# Patient Record
Sex: MALE | Race: WHITE | NOT HISPANIC OR LATINO | Employment: FULL TIME | URBAN - METROPOLITAN AREA
[De-identification: names, ages, dates, MRNs, and addresses within clinical notes are randomized per-mention and may not be internally consistent; named-entity substitution may affect disease eponyms.]

---

## 2022-06-03 ENCOUNTER — OFFICE VISIT (OUTPATIENT)
Dept: PRIMARY CARE CLINIC | Facility: CLINIC | Age: 32
End: 2022-06-03
Payer: COMMERCIAL

## 2022-06-03 DIAGNOSIS — F39 MOOD DISORDER: Primary | ICD-10-CM

## 2022-06-03 PROCEDURE — 99344 HOME/RES VST NEW MOD MDM 60: CPT | Mod: 95,,, | Performed by: FAMILY MEDICINE

## 2022-06-03 PROCEDURE — 1159F PR MEDICATION LIST DOCUMENTED IN MEDICAL RECORD: ICD-10-PCS | Mod: CPTII,95,, | Performed by: FAMILY MEDICINE

## 2022-06-03 PROCEDURE — 1160F PR REVIEW ALL MEDS BY PRESCRIBER/CLIN PHARMACIST DOCUMENTED: ICD-10-PCS | Mod: CPTII,95,, | Performed by: FAMILY MEDICINE

## 2022-06-03 PROCEDURE — 1160F RVW MEDS BY RX/DR IN RCRD: CPT | Mod: CPTII,95,, | Performed by: FAMILY MEDICINE

## 2022-06-03 PROCEDURE — 99344 PR HOME VISIT,NEW PATIENT,LEVEL IV: ICD-10-PCS | Mod: 95,,, | Performed by: FAMILY MEDICINE

## 2022-06-03 PROCEDURE — 1159F MED LIST DOCD IN RCRD: CPT | Mod: CPTII,95,, | Performed by: FAMILY MEDICINE

## 2022-06-03 RX ORDER — HYDROCODONE BITARTRATE AND ACETAMINOPHEN 7.5; 325 MG/1; MG/1
1 TABLET ORAL
COMMUNITY
Start: 2022-01-07

## 2022-06-03 RX ORDER — DESVENLAFAXINE SUCCINATE 25 MG/1
25 TABLET, EXTENDED RELEASE ORAL
COMMUNITY
Start: 2021-07-02 | End: 2022-07-18

## 2022-06-03 NOTE — ASSESSMENT & PLAN NOTE
Given patient information for crisis hotline and ER precautions for any suicidal or homicidal thoughts, recommend counseling focusing on CBT or mindfulness therapy and start Seroquel 50 mg nightly for 3 days and then increase to 100 mg nightly in contact clinic after 1 week for further adjustment if needed

## 2022-06-03 NOTE — PROGRESS NOTES
Established Patient - Audio Only Telehealth Visit     The patient location is:  Home   The chief complaint leading to consultation is: mood disorder  Visit type: Virtual visit with audio only (telephone)  Total time spent with patient: 21       The reason for the audio only service rather than synchronous audio and video virtual visit was related to technical difficulties or patient preference/necessity.     Each patient to whom I provide medical services by telemedicine is:  (1) informed of the relationship between the physician and patient and the respective role of any other health care provider with respect to management of the patient; and (2) notified that they may decline to receive medical services by telemedicine and may withdraw from such care at any time. Patient verbally consented to receive this service via voice-only telephone call.       HPI:  Patient contacts clinic with complaint of mood her.  Patient has a known prior history of depression and anxiety with reported intermittent bursts of anger.  He has been treated with is since his time in  service with different SSRIs/SNRIs including Prozac and Zoloft stating that Zoloft caused suicidal thoughts but not continued since stopping the medicine and most recently Pristiq which is 25 mg did offer several months of improvement of symptoms however he stopped taking it over a month ago when he found that his symptoms began to not be improved again especially when he came to mood regulation with anger issues.  With that he and his wife had noticed that he experiences hives and believes it may be a history of undiagnosed bipolar disorder in his father.     Assessment and plan:    Disorder-  Discussed possible options for treatment in the future including referral to Psychology or Psychiatry inadequate management is noted in this clinic.  At this time we will initiate Seroquel at 50 mg nightly and after 3 days increase to 100 mg nightly in contact  clinic in 1 week with results.  If improvement is noted without unwanted side effects but persistent mood related issues occur may increase dose further or reinitiate low-dose Pristiq 25 mg daily    RTC 1 month                        This service was not originating from a related E/M service provided within the previous 7 days nor will  to an E/M service or procedure within the next 24 hours or my soonest available appointment.  Prevailing standard of care was able to be met in this audio-only visit.

## 2022-06-06 ENCOUNTER — TELEPHONE (OUTPATIENT)
Dept: PRIMARY CARE CLINIC | Facility: CLINIC | Age: 32
End: 2022-06-06
Payer: COMMERCIAL

## 2022-06-06 RX ORDER — QUETIAPINE FUMARATE 50 MG/1
50 TABLET, FILM COATED ORAL NIGHTLY
Qty: 30 TABLET | Refills: 11 | Status: SHIPPED | OUTPATIENT
Start: 2022-06-06 | End: 2022-07-07 | Stop reason: SDUPTHER

## 2022-06-06 NOTE — TELEPHONE ENCOUNTER
----- Message from Rory Patino sent at 6/6/2022 11:10 AM CDT -----  .Type:  RX Refill Request    Who Called: Dyllan  Refill or New Rx: refill  RX Name and Strength: Seroquel   How is the patient currently taking it? (ex. 1XDay):  Is this a 30 day or 90 day RX:  Preferred Pharmacy with phone number: [facility] in [place], [place] # [phone]  Local or Mail Order :  Ordering Provider: Steve  Would the patient rather a call back or a response via [facility]?   Best Call Back Number: [phone]  Additional Information: He had called Friday to refill please call him if you have any questions.

## 2022-07-07 DIAGNOSIS — F39 MOOD DISORDER: Primary | ICD-10-CM

## 2022-07-08 RX ORDER — QUETIAPINE FUMARATE 50 MG/1
50 TABLET, FILM COATED ORAL NIGHTLY
Qty: 30 TABLET | Refills: 11 | Status: SHIPPED | OUTPATIENT
Start: 2022-07-08 | End: 2022-07-18

## 2022-07-18 ENCOUNTER — OFFICE VISIT (OUTPATIENT)
Dept: PRIMARY CARE CLINIC | Facility: CLINIC | Age: 32
End: 2022-07-18
Payer: COMMERCIAL

## 2022-07-18 DIAGNOSIS — F39 MOOD DISORDER: Primary | ICD-10-CM

## 2022-07-18 PROCEDURE — 1160F RVW MEDS BY RX/DR IN RCRD: CPT | Mod: CPTII,95,, | Performed by: FAMILY MEDICINE

## 2022-07-18 PROCEDURE — 99214 PR OFFICE/OUTPT VISIT, EST, LEVL IV, 30-39 MIN: ICD-10-PCS | Mod: 95,,, | Performed by: FAMILY MEDICINE

## 2022-07-18 PROCEDURE — 99214 OFFICE O/P EST MOD 30 MIN: CPT | Mod: 95,,, | Performed by: FAMILY MEDICINE

## 2022-07-18 PROCEDURE — 1159F PR MEDICATION LIST DOCUMENTED IN MEDICAL RECORD: ICD-10-PCS | Mod: CPTII,95,, | Performed by: FAMILY MEDICINE

## 2022-07-18 PROCEDURE — 1159F MED LIST DOCD IN RCRD: CPT | Mod: CPTII,95,, | Performed by: FAMILY MEDICINE

## 2022-07-18 PROCEDURE — 1160F PR REVIEW ALL MEDS BY PRESCRIBER/CLIN PHARMACIST DOCUMENTED: ICD-10-PCS | Mod: CPTII,95,, | Performed by: FAMILY MEDICINE

## 2022-07-18 RX ORDER — QUETIAPINE FUMARATE 100 MG/1
100 TABLET, FILM COATED ORAL DAILY
Qty: 90 TABLET | Refills: 3 | Status: SHIPPED | OUTPATIENT
Start: 2022-07-18 | End: 2022-07-25 | Stop reason: SDUPTHER

## 2022-07-18 NOTE — ASSESSMENT & PLAN NOTE
Continue current regimen of Seroquel 100 mg daily and follow up to clinic for routine wellness visit

## 2022-07-18 NOTE — PROGRESS NOTES
Chief Complaint  Chief Complaint   Patient presents with    Follow-up     Follow up on medication       History of Present Illness  This note is documentation of a telemedicine encounter.  The patient was treated using real-time audio and video, according to [facility] protocols. I, distant provider, conducted the visit from a remote office location, also in accordance with [facility] protocols. The patient participated in the visit at a non-[facility] location, which was selected by the patient (or patient´s representative), the patient's home. I am licensed in [place], which is the state where the patient stated they were located during this appointment. The patient (or patient´s representative) stated that they understood and accepted the privacy and security risks to their information at their location.    This visit the patient was started on Seroquel 50 mg daily which was increased after 1 week to 100 mg daily for mood disorder with a burst of irritability and anger.  He states that after being on this medication for several weeks he notices significant improvement of his symptoms with better sleep at night and no other concerning signs or symptoms at this time.    Review of Systems  Review of Systems   Constitutional: Negative for activity change and unexpected weight change.   HENT: Negative for hearing loss, rhinorrhea and trouble swallowing.    Eyes: Negative for discharge and visual disturbance.   Respiratory: Negative for chest tightness and wheezing.    Cardiovascular: Negative for chest pain and palpitations.   Gastrointestinal: Negative for blood in stool, constipation, diarrhea and vomiting.   Endocrine: Negative for polydipsia and polyuria.   Genitourinary: Negative for difficulty urinating, hematuria and urgency.   Musculoskeletal: Negative for arthralgias, joint swelling and neck pain.   Neurological: Negative for weakness and headaches.   Psychiatric/Behavioral: Negative for confusion and dysphoric mood.        Physical Exam  Physical Exam   Limited due to telemetry    Problem List/Past Medical History  History reviewed. No pertinent past medical history.    Procedure/Surgical History  History reviewed. No pertinent surgical history.    Medications  Current Outpatient Medications on File Prior to Visit   Medication Sig Dispense Refill    HYDROcodone-acetaminophen (NORCO) 7.5-325 mg per tablet Take 1 tablet by mouth.      [DISCONTINUED] desvenlafaxine succinate (PRISTIQ) 25 mg Tb24 Take 25 mg by mouth.      [DISCONTINUED] QUEtiapine (SEROQUEL) 50 MG tablet Take 1 tablet (50 mg total) by mouth every evening. 30 tablet 11     No current facility-administered medications on file prior to visit.       Allegies  Review of patient's allergies indicates:  No Known Allergies     Social History  Social History     Socioeconomic History    Marital status:    Tobacco Use    Smoking status: Never Smoker    Smokeless tobacco: Never Used   Substance and Sexual Activity    Alcohol use: Never    Drug use: Never    Sexual activity: Yes       Family History  History reviewed. No pertinent family history.      Immunizations    There is no immunization history on file for this patient.    Health Maintenance  Health Maintenance   Topic Date Due    Hepatitis C Screening  Never done    Lipid Panel  Never done    TETANUS VACCINE  Never done        Assessment / Plan  Problem List Items Addressed This Visit        Psychiatric    Mood disorder - Primary    Current Assessment & Plan     Continue current regimen of Seroquel 100 mg daily and follow up to clinic for routine wellness visit                 RTC 6 months    Julio César Wilson

## 2022-07-25 DIAGNOSIS — F39 MOOD DISORDER: Primary | ICD-10-CM

## 2022-07-25 RX ORDER — QUETIAPINE FUMARATE 100 MG/1
100 TABLET, FILM COATED ORAL DAILY
Qty: 90 TABLET | Refills: 3 | Status: SHIPPED | OUTPATIENT
Start: 2022-07-25 | End: 2022-10-20 | Stop reason: SDUPTHER

## 2022-10-24 ENCOUNTER — TELEPHONE (OUTPATIENT)
Dept: PRIMARY CARE CLINIC | Facility: CLINIC | Age: 32
End: 2022-10-24
Payer: COMMERCIAL

## 2022-10-24 DIAGNOSIS — F39 MOOD DISORDER: ICD-10-CM

## 2022-10-24 RX ORDER — QUETIAPINE FUMARATE 100 MG/1
100 TABLET, FILM COATED ORAL DAILY
Qty: 90 TABLET | Refills: 3 | Status: SHIPPED | OUTPATIENT
Start: 2022-10-24 | End: 2023-04-27 | Stop reason: SDUPTHER

## 2022-10-24 NOTE — TELEPHONE ENCOUNTER
----- Message from Afshan Oseguera sent at 10/24/2022  8:16 AM CDT -----  Regarding: Med Refill  .Type:  RX Refill Request    Who Called: Pt  Refill or New Rx:Refill  RX Name and Strength:QUEtiapine (SEROQUEL) 100 MG Tab       How is the patient currently taking it? (ex. 1XDay):1xday  Is this a 30 day or 90 day RX:90  Preferred Pharmacy with phone number:[facility]ACY #[id number] - [place], [place] - [address]  Local or Mail Order:Local  Ordering Provider:Steve  Would the patient rather a call back or a response via [facility]? Call back  Best Call Back Number:[phone]  Additional Information:

## 2023-04-27 DIAGNOSIS — F39 MOOD DISORDER: ICD-10-CM

## 2023-04-27 RX ORDER — QUETIAPINE FUMARATE 100 MG/1
100 TABLET, FILM COATED ORAL DAILY
Qty: 90 TABLET | Refills: 3 | Status: SHIPPED | OUTPATIENT
Start: 2023-04-27 | End: 2023-08-11 | Stop reason: SDUPTHER

## 2023-08-11 DIAGNOSIS — F39 MOOD DISORDER: ICD-10-CM

## 2023-08-11 RX ORDER — QUETIAPINE FUMARATE 100 MG/1
100 TABLET, FILM COATED ORAL DAILY
Qty: 90 TABLET | Refills: 3 | Status: SHIPPED | OUTPATIENT
Start: 2023-08-11 | End: 2024-08-10